# Patient Record
Sex: FEMALE | Race: WHITE | NOT HISPANIC OR LATINO | Employment: FULL TIME | URBAN - METROPOLITAN AREA
[De-identification: names, ages, dates, MRNs, and addresses within clinical notes are randomized per-mention and may not be internally consistent; named-entity substitution may affect disease eponyms.]

---

## 2021-05-10 ENCOUNTER — TELEPHONE (OUTPATIENT)
Dept: ALLERGY | Facility: CLINIC | Age: 61
End: 2021-05-10

## 2021-05-11 ENCOUNTER — TELEPHONE (OUTPATIENT)
Dept: ALLERGY | Facility: CLINIC | Age: 61
End: 2021-05-11

## 2021-06-10 ENCOUNTER — OFFICE VISIT (OUTPATIENT)
Dept: ALLERGY | Facility: CLINIC | Age: 61
End: 2021-06-10
Payer: COMMERCIAL

## 2021-06-10 VITALS
BODY MASS INDEX: 31.27 KG/M2 | HEART RATE: 79 BPM | TEMPERATURE: 98 F | WEIGHT: 183.19 LBS | SYSTOLIC BLOOD PRESSURE: 113 MMHG | DIASTOLIC BLOOD PRESSURE: 68 MMHG | HEIGHT: 64 IN

## 2021-06-10 DIAGNOSIS — J40 BRONCHITIS: ICD-10-CM

## 2021-06-10 DIAGNOSIS — J30.89 NON-SEASONAL ALLERGIC RHINITIS DUE TO OTHER ALLERGIC TRIGGER: ICD-10-CM

## 2021-06-10 DIAGNOSIS — R06.83 SNORING: ICD-10-CM

## 2021-06-10 DIAGNOSIS — R06.5 MOUTH BREATHING: ICD-10-CM

## 2021-06-10 DIAGNOSIS — J01.90 ACUTE NON-RECURRENT SINUSITIS, UNSPECIFIED LOCATION: Primary | ICD-10-CM

## 2021-06-10 DIAGNOSIS — E66.9 OBESITY, UNSPECIFIED CLASSIFICATION, UNSPECIFIED OBESITY TYPE, UNSPECIFIED WHETHER SERIOUS COMORBIDITY PRESENT: ICD-10-CM

## 2021-06-10 PROCEDURE — 99214 OFFICE O/P EST MOD 30 MIN: CPT | Mod: S$GLB,,, | Performed by: SPECIALIST

## 2021-06-10 PROCEDURE — 3008F BODY MASS INDEX DOCD: CPT | Mod: CPTII,S$GLB,, | Performed by: SPECIALIST

## 2021-06-10 PROCEDURE — 3008F PR BODY MASS INDEX (BMI) DOCUMENTED: ICD-10-PCS | Mod: CPTII,S$GLB,, | Performed by: SPECIALIST

## 2021-06-10 PROCEDURE — 99999 PR PBB SHADOW E&M-EST. PATIENT-LVL III: CPT | Mod: PBBFAC,,, | Performed by: SPECIALIST

## 2021-06-10 PROCEDURE — 99214 PR OFFICE/OUTPT VISIT, EST, LEVL IV, 30-39 MIN: ICD-10-PCS | Mod: S$GLB,,, | Performed by: SPECIALIST

## 2021-06-10 PROCEDURE — 99999 PR PBB SHADOW E&M-EST. PATIENT-LVL III: ICD-10-PCS | Mod: PBBFAC,,, | Performed by: SPECIALIST

## 2021-06-10 RX ORDER — LEVOCETIRIZINE DIHYDROCHLORIDE 5 MG/1
5 TABLET, FILM COATED ORAL NIGHTLY
COMMUNITY

## 2021-06-10 RX ORDER — OLMESARTAN MEDOXOMIL 40 MG/1
TABLET ORAL
COMMUNITY

## 2021-06-10 RX ORDER — PREDNISONE 20 MG/1
20 TABLET ORAL DAILY PRN
Qty: 15 TABLET | Refills: 0 | Status: SHIPPED | OUTPATIENT
Start: 2021-06-10 | End: 2021-06-15

## 2021-06-10 RX ORDER — PRAVASTATIN SODIUM 80 MG/1
TABLET ORAL
COMMUNITY
Start: 2021-05-07

## 2021-06-10 RX ORDER — PANTOPRAZOLE SODIUM 40 MG/1
TABLET, DELAYED RELEASE ORAL
COMMUNITY
Start: 2021-01-27

## 2021-06-10 RX ORDER — ASPIRIN 81 MG/1
81 TABLET ORAL
COMMUNITY

## 2021-06-10 RX ORDER — AMLODIPINE AND OLMESARTAN MEDOXOMIL 5; 20 MG/1; MG/1
TABLET ORAL
COMMUNITY
Start: 2020-07-16

## 2021-06-10 RX ORDER — AZITHROMYCIN 250 MG/1
TABLET, FILM COATED ORAL
Qty: 11 TABLET | Refills: 0 | Status: SHIPPED | OUTPATIENT
Start: 2021-06-10 | End: 2021-06-20

## 2021-06-10 RX ORDER — ALPRAZOLAM 0.25 MG/1
0.25 TABLET ORAL DAILY PRN
COMMUNITY
Start: 2020-07-31 | End: 2023-06-15

## 2021-06-10 RX ORDER — DULOXETIN HYDROCHLORIDE 60 MG/1
60 CAPSULE, DELAYED RELEASE ORAL DAILY
COMMUNITY

## 2021-06-10 RX ORDER — FLUTICASONE PROPIONATE 50 MCG
1 SPRAY, SUSPENSION (ML) NASAL
COMMUNITY
Start: 2021-02-03 | End: 2021-08-02

## 2021-06-10 RX ORDER — INDAPAMIDE 1.25 MG/1
TABLET ORAL
COMMUNITY

## 2021-06-10 RX ORDER — PROMETHAZINE HYDROCHLORIDE AND DEXTROMETHORPHAN HYDROBROMIDE 6.25; 15 MG/5ML; MG/5ML
SYRUP ORAL
COMMUNITY

## 2021-09-13 PROBLEM — J01.90 ACUTE NON-RECURRENT SINUSITIS: Status: RESOLVED | Noted: 2021-06-10 | Resolved: 2021-09-13

## 2022-06-14 NOTE — PROGRESS NOTES
Subjective:       Patient ID: Wilber Lancaster is a 62 y.o. female.    Chief Complaint:    Follow up on allergic rhinitis and  suinusitis    HPI:    [age] year old WF for follow up.Has a history of having recurrent sinusitis, bronchitis and URIs and LRIs.  ALL infections were promptly treated. Immune work up revealed the need to immunize with Pneumococcal vaccines.   She was immunized with two PPSV- 23-- LAST ONE WAS ON 12- 29- 2010.   IS VERY MUCH HELPED BY THE PPSV - 23 vaccines and is having less number of infections.  Has year round nasal and eye allergies- treated symptomatically-- not on SLIT or SCIT.    HAS A LONG STANDING HISTORY OF GERD-- TREATED WITH A PPI    In Jan 2021-- she had moderate severity COVID-- NOW IMMUNIZED WITH COVID VACCINE - 2 DOSES.  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 No ongoing allergens or irritants exposure       Doxylamin-pse-dm-acetaminophen, Pseudoephedrine hcl, and Triprolidine-pseudoephedrine --- are the drug allergies reported    Past Medical History:   Diagnosis Date    Allergy        No family history on file.    Environmental History: Dust Mite Controls: Dust mite controls are already in place.     Review of Systems   Constitutional: Negative.  Negative for fatigue and fever.   HENT: Positive for congestion, postnasal drip and rhinorrhea. Negative for ear pain, sinus pressure, sinus pain, sneezing and sore throat.    Eyes: Positive for itching. Negative for redness.   Respiratory: Negative.  Negative for cough, chest tightness, shortness of breath and wheezing.    Cardiovascular: Negative.  Negative for chest pain.   Gastrointestinal: Negative.  Negative for nausea.   Endocrine: Negative.  Negative for cold intolerance.   Genitourinary: Negative.  Negative for frequency.   Musculoskeletal: Negative.  Negative for myalgias.   Skin: Negative.  Negative for rash.   Allergic/Immunologic: Positive for environmental allergies. Negative for food allergies and immunocompromised  state.   Neurological: Negative.  Negative for dizziness and headaches.   Hematological: Negative.  Negative for adenopathy.   Psychiatric/Behavioral: Negative.  Negative for sleep disturbance.       Objective:     There were no vitals taken for this visit.    Physical Exam  Vitals and nursing note reviewed.   Constitutional:       Appearance: Normal appearance. She is obese.   HENT:      Head: Normocephalic and atraumatic.      Right Ear: Tympanic membrane, ear canal and external ear normal.      Left Ear: Tympanic membrane, ear canal and external ear normal.      Nose: Congestion present.      Mouth/Throat:      Mouth: Mucous membranes are dry.      Pharynx: Oropharynx is clear.   Eyes:      Extraocular Movements: Extraocular movements intact.      Conjunctiva/sclera: Conjunctivae normal.      Pupils: Pupils are equal, round, and reactive to light.   Cardiovascular:      Rate and Rhythm: Normal rate and regular rhythm.      Pulses: Normal pulses.      Heart sounds: Normal heart sounds.   Pulmonary:      Effort: Pulmonary effort is normal.      Breath sounds: Normal breath sounds.   Abdominal:      General: Abdomen is flat. Bowel sounds are normal.      Palpations: Abdomen is soft.   Musculoskeletal:         General: Normal range of motion.      Cervical back: Normal range of motion and neck supple.   Skin:     General: Skin is warm and dry.      Capillary Refill: Capillary refill takes less than 2 seconds.   Neurological:      General: No focal deficit present.      Mental Status: She is alert and oriented to person, place, and time. Mental status is at baseline.   Psychiatric:         Mood and Affect: Mood normal.         Behavior: Behavior normal.         Thought Content: Thought content normal.         Judgment: Judgment normal.           Assessment:      1. Perennial allergic rhinitis with seasonal variation    2. PND (post-nasal drip)    3. Mouth breathing    4. Nasal congestion    5       Breast cancer  survivor.  6       SEASONAL ALLERGIC Conjunctivitis    Plan:       FLONASE 50 Mcg qd or bid  ASTELIN 137 mcg-- 2 sprays per nares qd or bid  XYZAL 5mg qd  Systane or Thera Tears 2 drops bid prn  Treat all infections  For acute sinusitis-- a 10 days course of Azithromycin 250 mg plus Prednisone 20 mg qd for 3- 5 days  Had COVID in JAN 2021.  HAD covid vaccine -- 2 doses.  Had PPSV- 23-- TWO DOSES April 1999 and December 2010  Yearly follow ups                  Problems Address                                                 Amount and/or Complexity                                                                      Risk       3           [] 2 or more self-limited or minor problems                      [] Limited                                                                        [] Low                  [] 1 stable chronic illness                                                  Any combination of the two                                               OTC drugs                  []Acute, uncomplicated illness or injury                            Review of prior external notes from unique source           Minor surgery with no risk factors                                                                                                               [] 1 []2  []3+                                                                                                              Review of results from each unique test                                                                                                               [] 1 []2  [] 3+                                                                                                              Order of each unique test                                                                                                               [] 1 []2  [] 3+                                                                                                              Or                                                                                                              [] Assessment requiring an independent historian      4            [x] One or more chronic illness with exacerbation,              [x] Moderate                                                                      [x] Moderate                 Progression, or side effects of treatment                            -test documents or independent historians                        Prescription drug management                [x]  2 or more sable chronic illnesses                                    [] Independent interpretation of tests                              Minor surgery with identifiable risk                [] 1 undiagnosed new problem with uncertain prognosis    [] Discussion or management of test results                    elective major surgery                [] 1 acute illness with                systemic symptoms                                                                                                                                                              [] 1 acute complicated injury                                                                                                                                          Elective major surgery                                                                                                                                                                                                                                                                                                                                                                                                  5            [] 1 or more chronic illnesses with severe exacerbation,     [] Extensive(two from below)                                         [] High                                                                                                               [] Independent interpretation of  results                         Drug therapy requiring intensive                                                                                                               []Discussion of management or test interpretation           monitoring                                                                                                                                                                                                       Decision to de-escalate care                 [] 1 acute or chronic illness or injury that poses a threat                                                                                               Decision regarding hospitalization

## 2022-06-15 ENCOUNTER — OFFICE VISIT (OUTPATIENT)
Dept: ALLERGY | Facility: CLINIC | Age: 62
End: 2022-06-15
Payer: COMMERCIAL

## 2022-06-15 VITALS
TEMPERATURE: 98 F | HEIGHT: 63 IN | SYSTOLIC BLOOD PRESSURE: 109 MMHG | WEIGHT: 189.63 LBS | DIASTOLIC BLOOD PRESSURE: 68 MMHG | HEART RATE: 75 BPM | BODY MASS INDEX: 33.6 KG/M2

## 2022-06-15 DIAGNOSIS — J30.89 PERENNIAL ALLERGIC RHINITIS WITH SEASONAL VARIATION: Primary | ICD-10-CM

## 2022-06-15 DIAGNOSIS — R09.81 NASAL CONGESTION: ICD-10-CM

## 2022-06-15 DIAGNOSIS — J30.2 PERENNIAL ALLERGIC RHINITIS WITH SEASONAL VARIATION: Primary | ICD-10-CM

## 2022-06-15 DIAGNOSIS — R09.82 PND (POST-NASAL DRIP): ICD-10-CM

## 2022-06-15 DIAGNOSIS — R06.5 MOUTH BREATHING: ICD-10-CM

## 2022-06-15 PROCEDURE — 3078F PR MOST RECENT DIASTOLIC BLOOD PRESSURE < 80 MM HG: ICD-10-PCS | Mod: CPTII,S$GLB,, | Performed by: SPECIALIST

## 2022-06-15 PROCEDURE — 1160F PR REVIEW ALL MEDS BY PRESCRIBER/CLIN PHARMACIST DOCUMENTED: ICD-10-PCS | Mod: CPTII,S$GLB,, | Performed by: SPECIALIST

## 2022-06-15 PROCEDURE — 99999 PR PBB SHADOW E&M-EST. PATIENT-LVL IV: ICD-10-PCS | Mod: PBBFAC,,, | Performed by: SPECIALIST

## 2022-06-15 PROCEDURE — 1160F RVW MEDS BY RX/DR IN RCRD: CPT | Mod: CPTII,S$GLB,, | Performed by: SPECIALIST

## 2022-06-15 PROCEDURE — 1159F MED LIST DOCD IN RCRD: CPT | Mod: CPTII,S$GLB,, | Performed by: SPECIALIST

## 2022-06-15 PROCEDURE — 1159F PR MEDICATION LIST DOCUMENTED IN MEDICAL RECORD: ICD-10-PCS | Mod: CPTII,S$GLB,, | Performed by: SPECIALIST

## 2022-06-15 PROCEDURE — 99999 PR PBB SHADOW E&M-EST. PATIENT-LVL IV: CPT | Mod: PBBFAC,,, | Performed by: SPECIALIST

## 2022-06-15 PROCEDURE — 4010F PR ACE/ARB THEARPY RXD/TAKEN: ICD-10-PCS | Mod: CPTII,S$GLB,, | Performed by: SPECIALIST

## 2022-06-15 PROCEDURE — 3008F BODY MASS INDEX DOCD: CPT | Mod: CPTII,S$GLB,, | Performed by: SPECIALIST

## 2022-06-15 PROCEDURE — 3078F DIAST BP <80 MM HG: CPT | Mod: CPTII,S$GLB,, | Performed by: SPECIALIST

## 2022-06-15 PROCEDURE — 3074F PR MOST RECENT SYSTOLIC BLOOD PRESSURE < 130 MM HG: ICD-10-PCS | Mod: CPTII,S$GLB,, | Performed by: SPECIALIST

## 2022-06-15 PROCEDURE — 4010F ACE/ARB THERAPY RXD/TAKEN: CPT | Mod: CPTII,S$GLB,, | Performed by: SPECIALIST

## 2022-06-15 PROCEDURE — 3008F PR BODY MASS INDEX (BMI) DOCUMENTED: ICD-10-PCS | Mod: CPTII,S$GLB,, | Performed by: SPECIALIST

## 2022-06-15 PROCEDURE — 3074F SYST BP LT 130 MM HG: CPT | Mod: CPTII,S$GLB,, | Performed by: SPECIALIST

## 2022-06-15 PROCEDURE — 99214 OFFICE O/P EST MOD 30 MIN: CPT | Mod: S$GLB,,, | Performed by: SPECIALIST

## 2022-06-15 PROCEDURE — 99214 PR OFFICE/OUTPT VISIT, EST, LEVL IV, 30-39 MIN: ICD-10-PCS | Mod: S$GLB,,, | Performed by: SPECIALIST

## 2022-06-15 RX ORDER — METFORMIN HYDROCHLORIDE 500 MG/1
TABLET, EXTENDED RELEASE ORAL
COMMUNITY
Start: 2022-05-15

## 2022-06-15 RX ORDER — AZELASTINE 1 MG/ML
2 SPRAY, METERED NASAL 2 TIMES DAILY
Qty: 30 ML | Refills: 11 | Status: SHIPPED | OUTPATIENT
Start: 2022-06-15 | End: 2023-06-15

## 2023-06-13 NOTE — PROGRESS NOTES
Subjective:       Patient ID: Wilber Lancaster is a 63 y.o. female.    Chief Complaint:    FOLLOW UP ON ALLERGIC RHINITIS AND RECURRENT INFECTIONS    HPI:   female 63 year old had had decades history of recurrent sinusitis and sino- bronchitis-- She did well  after immune  Work up and immunization with at least Pneumovax- 23-- Last one in December 2020.  Since the she has been getting much less infections    Has decades of history of perennial allergic rhinitis-- No recent allergy work up.  She was not started on AIT- SCIT OR SLIT.  Never smoked cigarettes or vaped. nNo ongoing allergens exposure.    Had had GERD for years- treated with a PPI as needed and anti reflux measures.  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    Past Medical History:   Diagnosis Date    Allergy        No family history on file.    Environmental History: Dust Mite Controls: Dust mite controls are already in place.     Review of Systems   Constitutional: Negative.  Negative for fatigue and fever.   HENT:  Positive for congestion, postnasal drip and rhinorrhea. Negative for ear pain, sinus pressure, sinus pain, sneezing and sore throat.    Eyes:  Positive for itching. Negative for redness.   Respiratory: Negative.  Negative for cough, chest tightness, shortness of breath and wheezing.    Cardiovascular: Negative.  Negative for chest pain.   Gastrointestinal: Negative.  Negative for nausea.   Endocrine: Negative.  Negative for cold intolerance.   Genitourinary: Negative.  Negative for frequency.   Musculoskeletal: Negative.  Negative for myalgias.   Skin: Negative.  Negative for rash.   Allergic/Immunologic: Positive for environmental allergies. Negative for food allergies and immunocompromised state.   Neurological:  Negative.  Negative for dizziness and headaches.   Hematological: Negative.  Negative for adenopathy.   Psychiatric/Behavioral: Negative.  Negative for sleep disturbance.      Objective:     There were no vitals taken for this visit.    Physical Exam  Vitals and nursing note reviewed.   Constitutional:       Appearance: Normal appearance. She is normal weight.   HENT:      Head: Normocephalic and atraumatic.      Right Ear: Tympanic membrane, ear canal and external ear normal.      Left Ear: Tympanic membrane, ear canal and external ear normal.      Nose: Congestion present.      Mouth/Throat:      Mouth: Mucous membranes are moist.      Pharynx: Oropharynx is clear.   Eyes:      Extraocular Movements: Extraocular movements intact.      Conjunctiva/sclera: Conjunctivae normal.      Pupils: Pupils are equal, round, and reactive to light.   Cardiovascular:      Rate and Rhythm: Normal rate and regular rhythm.      Pulses: Normal pulses.   Pulmonary:      Effort: Pulmonary effort is normal.      Breath sounds: Normal breath sounds.   Abdominal:      General: Abdomen is flat. Bowel sounds are normal.      Palpations: Abdomen is soft.   Musculoskeletal:         General: Normal range of motion.      Cervical back: Normal range of motion and neck supple.   Skin:     General: Skin is warm and dry.      Capillary Refill: Capillary refill takes less than 2 seconds.   Neurological:      General: No focal deficit present.      Mental Status: She is alert and oriented to person, place, and time. Mental status is at baseline.   Psychiatric:         Mood and Affect: Mood normal.         Behavior: Behavior normal.         Thought Content: Thought content normal.         Judgment: Judgment normal.       Assessment:      Perennial ALLERGIC Rhinitis- with seasonal exacerbation.  PND  Nasal congestion.  Seasonal allergic Conjunctivitis.  Breast cancer survivor  Recurrent sinusitis      Plan:     Treat all infections-- a course of  Azithromycin 250 mg plus prednisone 20 mg qd for 3- 5 days  XYZAL 5 mg.  Azelastine 137 mcg  and Flonase 50 mcg - qd or bid  Thera Tears or Systane 2 drops bid.  Had 4 DOSES OF COVID vaccine.-- had COVID at [facility]23-- AFTER 4 MONTH OLD CHILD BROUGHT IT FROM THE   Had two doses of Pneumovax- 23( April 1999 and December 2010 ).  May immunize with PCV- 20.  ANNUAL INFLUENZA VACCINATIONS  Follow up in one year.                  Problems Address                                                 Amount and/or Complexity                                                                      Risk       3           [] 2 or more self-limited or minor problems                      [] Limited                                                                        [] Low                  [] 1 stable chronic illness                                                  Any combination of the two                                               OTC drugs                  []Acute, uncomplicated illness or injury                            Review of prior external notes from unique source           Minor surgery with no risk factors                                                                                                               [] 1 []2  []3+                                                                                                              Review of results from each unique test                                                                                                               [] 1 []2  [] 3+                                                                                                              Order of each unique test                                                                                                               [] 1 []2  [] 3+                                                                                                              Or                                                                                                              [] Assessment requiring an independent historian      4            [x] One or more chronic illness with exacerbation,              [x] Moderate                                                                      [x] Moderate                 Progression, or side effects of treatment                            -test documents or independent historians                        Prescription drug management                [x]  2 or more sable chronic illnesses                                    [] Independent interpretation of tests                              Minor surgery with identifiable risk                [] 1 undiagnosed new problem with uncertain prognosis    [] Discussion or management of test results                    elective major surgery                [] 1 acute illness with                systemic symptoms                                                                                                                                                              [] 1 acute complicated injury                                                                                                                                          Elective major surgery                                                                                                                                                                                                                                                                                                                                                                                                  5            [] 1 or more chronic illnesses with severe exacerbation,     [] Extensive(two from below)                                         [] High                                                                                                               [] Independent interpretation of results                          Drug therapy requiring intensive                                                                                                               []Discussion of management or test interpretation           monitoring                                                                                                                                                                                                       Decision to de-escalate care                 [] 1 acute or chronic illness or injury that poses a threat                                                                                               Decision regarding hospitalization

## 2023-06-15 ENCOUNTER — OFFICE VISIT (OUTPATIENT)
Dept: ALLERGY | Facility: CLINIC | Age: 63
End: 2023-06-15
Payer: COMMERCIAL

## 2023-06-15 VITALS
DIASTOLIC BLOOD PRESSURE: 71 MMHG | BODY MASS INDEX: 31.99 KG/M2 | HEART RATE: 89 BPM | TEMPERATURE: 98 F | WEIGHT: 180.56 LBS | RESPIRATION RATE: 19 BRPM | SYSTOLIC BLOOD PRESSURE: 126 MMHG | HEIGHT: 63 IN

## 2023-06-15 DIAGNOSIS — R09.82 PND (POST-NASAL DRIP): ICD-10-CM

## 2023-06-15 DIAGNOSIS — R06.5 MOUTH BREATHING: ICD-10-CM

## 2023-06-15 DIAGNOSIS — H10.10 SEASONAL ALLERGIC CONJUNCTIVITIS: ICD-10-CM

## 2023-06-15 DIAGNOSIS — J30.89 PERENNIAL ALLERGIC RHINITIS WITH SEASONAL VARIATION: Primary | ICD-10-CM

## 2023-06-15 DIAGNOSIS — J30.2 PERENNIAL ALLERGIC RHINITIS WITH SEASONAL VARIATION: Primary | ICD-10-CM

## 2023-06-15 PROCEDURE — 1159F MED LIST DOCD IN RCRD: CPT | Mod: CPTII,S$GLB,, | Performed by: SPECIALIST

## 2023-06-15 PROCEDURE — 1160F RVW MEDS BY RX/DR IN RCRD: CPT | Mod: CPTII,S$GLB,, | Performed by: SPECIALIST

## 2023-06-15 PROCEDURE — 99214 PR OFFICE/OUTPT VISIT, EST, LEVL IV, 30-39 MIN: ICD-10-PCS | Mod: S$GLB,,, | Performed by: SPECIALIST

## 2023-06-15 PROCEDURE — 99999 PR PBB SHADOW E&M-EST. PATIENT-LVL IV: CPT | Mod: PBBFAC,,, | Performed by: SPECIALIST

## 2023-06-15 PROCEDURE — 3008F BODY MASS INDEX DOCD: CPT | Mod: CPTII,S$GLB,, | Performed by: SPECIALIST

## 2023-06-15 PROCEDURE — 3074F PR MOST RECENT SYSTOLIC BLOOD PRESSURE < 130 MM HG: ICD-10-PCS | Mod: CPTII,S$GLB,, | Performed by: SPECIALIST

## 2023-06-15 PROCEDURE — 1160F PR REVIEW ALL MEDS BY PRESCRIBER/CLIN PHARMACIST DOCUMENTED: ICD-10-PCS | Mod: CPTII,S$GLB,, | Performed by: SPECIALIST

## 2023-06-15 PROCEDURE — 3078F PR MOST RECENT DIASTOLIC BLOOD PRESSURE < 80 MM HG: ICD-10-PCS | Mod: CPTII,S$GLB,, | Performed by: SPECIALIST

## 2023-06-15 PROCEDURE — 3074F SYST BP LT 130 MM HG: CPT | Mod: CPTII,S$GLB,, | Performed by: SPECIALIST

## 2023-06-15 PROCEDURE — 3078F DIAST BP <80 MM HG: CPT | Mod: CPTII,S$GLB,, | Performed by: SPECIALIST

## 2023-06-15 PROCEDURE — 3008F PR BODY MASS INDEX (BMI) DOCUMENTED: ICD-10-PCS | Mod: CPTII,S$GLB,, | Performed by: SPECIALIST

## 2023-06-15 PROCEDURE — 4010F PR ACE/ARB THEARPY RXD/TAKEN: ICD-10-PCS | Mod: CPTII,S$GLB,, | Performed by: SPECIALIST

## 2023-06-15 PROCEDURE — 99999 PR PBB SHADOW E&M-EST. PATIENT-LVL IV: ICD-10-PCS | Mod: PBBFAC,,, | Performed by: SPECIALIST

## 2023-06-15 PROCEDURE — 4010F ACE/ARB THERAPY RXD/TAKEN: CPT | Mod: CPTII,S$GLB,, | Performed by: SPECIALIST

## 2023-06-15 PROCEDURE — 1159F PR MEDICATION LIST DOCUMENTED IN MEDICAL RECORD: ICD-10-PCS | Mod: CPTII,S$GLB,, | Performed by: SPECIALIST

## 2023-06-15 PROCEDURE — 99214 OFFICE O/P EST MOD 30 MIN: CPT | Mod: S$GLB,,, | Performed by: SPECIALIST

## 2024-06-08 NOTE — PROGRESS NOTES
Subjective:       Patient ID: Wilber Lancaster is a 64 y.o. female.    Chief Complaint:    FOLLOW UP ON PERENNIAL ALLERGIC RHINITIS AND RECURRENT SINUSITIS- doing OK    HPI:    Follow up--  64 year old for annual follow up..  Brittani has been a patient for over 2 decades.   AFTER IMMUNE WORK UP, SHE WAS IMMUNIZED WITH Pneumovax- 23 AND Prevnar- 13.  Those have helped her very much..    Brittani had had perennial allergic rhinitis- She was never on AIT- SCIT OR SLIT.  . BRITTANI HAS DONE WELL ON SYMPTOMATIC TREATMENT.  She has a long standing history of GERD- treated with anti reflux measures and a PPI- as needed.  Never smoked cigarettes or vaped. No ongoing allergens or irritants exposure.  Gets follow up oncology visits- as a breast cancer survivor. Not yet retired .- works 5- days a week.  Has 2 grand children. Will keep yearly follow ups.         Doxylamin-pse-dm-acetaminophen, Pseudoephedrine hcl, and Triprolidine-pseudoephedrine -- allergies were reported    Past Medical History:   Diagnosis Date    Allergy        No family history on file.    Environmental History: Dust Mite Controls: Dust mite controls are already in place.     Review of Systems   Constitutional: Negative.    HENT:  Positive for congestion, postnasal drip and rhinorrhea.    Eyes: Negative.    Respiratory: Negative.     Cardiovascular: Negative.    Gastrointestinal: Negative.    Endocrine: Negative.    Genitourinary: Negative.    Musculoskeletal: Negative.    Skin: Negative.    Allergic/Immunologic: Positive for environmental allergies.   Neurological: Negative.    Hematological: Negative.      Objective:     There were no vitals taken for this visit.    Physical Exam  Vitals and nursing note reviewed.   Constitutional:       Appearance: Normal appearance. She is obese.   HENT:      Head: Normocephalic and atraumatic.      Right Ear: Tympanic membrane, ear canal and external ear normal.      Left Ear: Tympanic membrane, ear  canal and external ear normal.      Nose: Congestion and rhinorrhea present.      Mouth/Throat:      Mouth: Mucous membranes are moist.      Pharynx: Oropharynx is clear.   Eyes:      Extraocular Movements: Extraocular movements intact.      Conjunctiva/sclera: Conjunctivae normal.      Pupils: Pupils are equal, round, and reactive to light.   Cardiovascular:      Rate and Rhythm: Normal rate and regular rhythm.      Pulses: Normal pulses.      Heart sounds: Normal heart sounds.   Pulmonary:      Effort: Pulmonary effort is normal.      Breath sounds: Normal breath sounds.   Abdominal:      General: Abdomen is flat. Bowel sounds are normal.      Palpations: Abdomen is soft.   Musculoskeletal:         General: Normal range of motion.      Cervical back: Normal range of motion and neck supple.   Skin:     General: Skin is warm and dry.      Capillary Refill: Capillary refill takes less than 2 seconds.   Neurological:      General: No focal deficit present.      Mental Status: She is alert and oriented to person, place, and time. Mental status is at baseline.   Psychiatric:         Mood and Affect: Mood normal.         Behavior: Behavior normal.         Thought Content: Thought content normal.         Judgment: Judgment normal.       Assessment:      1. Recurrent sinusitis    2. Perennial allergic rhinitis with seasonal variation    3. PND (post-nasal drip)    4. Nasal congestion    5. Seasonal allergic conjunctivitis    6       Breast cancer survivor    Plan:     Re- start on -Azelastine 137 mcg and-- not taking- Flonase 50 mcg qd / bid  XYZAL 5 mg.- likes it at nights  Thera Tears / Systane gel hs.  Had immunization with 2 Pneumovax- 23------ last one in December 2010  May immunize with Prevnar- 20.  --------------------------------------------------------  Had AREXVY.- 11- 17- 2023  HAD 2 DOSES OF SHINGRIX.  Had 3 doses of COVID vaccine.  May immunize with influenza vaccinations.  Had COVID- Easter of 2023.  Yearly  follow ups.                  Problems Address                                                 Amount and/or Complexity                                                                      Risk       3           [] 2 or more self-limited or minor problems                      [] Limited                                                                        [] Low                  [] 1 stable chronic illness                                                  Any combination of the two                                               OTC drugs                  []Acute, uncomplicated illness or injury                            Review of prior external notes from unique source           Minor surgery with no risk factors                                                                                                               [] 1 []2  []3+                                                                                                              Review of results from each unique test                                                                                                               [] 1 []2  [] 3+                                                                                                              Order of each unique test                                                                                                               [] 1 []2  [] 3+                                                                                                              Or                                                                                                             [] Assessment requiring an independent historian      4            [x] One or more chronic illness with exacerbation,              [x] Moderate                                                                      [x] Moderate                 Progression, or side effects of treatment                            -test documents or independent  historians                        Prescription drug management                [x]  2 or more sable chronic illnesses                                    [] Independent interpretation of tests                              Minor surgery with identifiable risk                [] 1 undiagnosed new problem with uncertain prognosis    [] Discussion or management of test results                    elective major surgery                [] 1 acute illness with                systemic symptoms                                                                                                                                                              [] 1 acute complicated injury                                                                                                                                          Elective major surgery                                                                                                                                                                                                                                                                                                                                                                                                  5            [] 1 or more chronic illnesses with severe exacerbation,     [] Extensive(two from below)                                         [] High                                                                                                               [] Independent interpretation of results                         Drug therapy requiring intensive                                                                                                               []Discussion of management or test interpretation           monitoring                                                                                                                                                                                                        Decision to de-escalate care                 [] 1 acute or chronic illness or injury that poses a threat                                                                                               Decision regarding hospitalization

## 2024-06-10 ENCOUNTER — OFFICE VISIT (OUTPATIENT)
Dept: ALLERGY | Facility: CLINIC | Age: 64
End: 2024-06-10
Payer: COMMERCIAL

## 2024-06-10 VITALS
DIASTOLIC BLOOD PRESSURE: 72 MMHG | BODY MASS INDEX: 31.91 KG/M2 | WEIGHT: 180.13 LBS | TEMPERATURE: 98 F | SYSTOLIC BLOOD PRESSURE: 114 MMHG | HEART RATE: 75 BPM

## 2024-06-10 DIAGNOSIS — J30.89 PERENNIAL ALLERGIC RHINITIS WITH SEASONAL VARIATION: ICD-10-CM

## 2024-06-10 DIAGNOSIS — R09.81 NASAL CONGESTION: ICD-10-CM

## 2024-06-10 DIAGNOSIS — H10.10 SEASONAL ALLERGIC CONJUNCTIVITIS: ICD-10-CM

## 2024-06-10 DIAGNOSIS — R09.82 PND (POST-NASAL DRIP): ICD-10-CM

## 2024-06-10 DIAGNOSIS — J30.2 PERENNIAL ALLERGIC RHINITIS WITH SEASONAL VARIATION: ICD-10-CM

## 2024-06-10 DIAGNOSIS — J32.9 RECURRENT SINUSITIS: Primary | ICD-10-CM

## 2024-06-10 PROCEDURE — 4010F ACE/ARB THERAPY RXD/TAKEN: CPT | Mod: CPTII,S$GLB,, | Performed by: SPECIALIST

## 2024-06-10 PROCEDURE — 3008F BODY MASS INDEX DOCD: CPT | Mod: CPTII,S$GLB,, | Performed by: SPECIALIST

## 2024-06-10 PROCEDURE — 99999 PR PBB SHADOW E&M-EST. PATIENT-LVL IV: CPT | Mod: PBBFAC,,, | Performed by: SPECIALIST

## 2024-06-10 PROCEDURE — 3078F DIAST BP <80 MM HG: CPT | Mod: CPTII,S$GLB,, | Performed by: SPECIALIST

## 2024-06-10 PROCEDURE — 3074F SYST BP LT 130 MM HG: CPT | Mod: CPTII,S$GLB,, | Performed by: SPECIALIST

## 2024-06-10 PROCEDURE — 99214 OFFICE O/P EST MOD 30 MIN: CPT | Mod: S$GLB,,, | Performed by: SPECIALIST

## 2024-06-10 PROCEDURE — 1160F RVW MEDS BY RX/DR IN RCRD: CPT | Mod: CPTII,S$GLB,, | Performed by: SPECIALIST

## 2024-06-10 PROCEDURE — 1159F MED LIST DOCD IN RCRD: CPT | Mod: CPTII,S$GLB,, | Performed by: SPECIALIST

## 2024-06-10 RX ORDER — AZELASTINE 1 MG/ML
2 SPRAY, METERED NASAL 2 TIMES DAILY
Qty: 30 ML | Refills: 11 | Status: SHIPPED | OUTPATIENT
Start: 2024-06-10 | End: 2024-07-10

## 2025-06-24 ENCOUNTER — TELEPHONE (OUTPATIENT)
Dept: ALLERGY | Facility: CLINIC | Age: 65
End: 2025-06-24
Payer: COMMERCIAL

## 2025-06-25 ENCOUNTER — OFFICE VISIT (OUTPATIENT)
Dept: ALLERGY | Facility: CLINIC | Age: 65
End: 2025-06-25
Payer: COMMERCIAL

## 2025-06-25 VITALS
TEMPERATURE: 98 F | WEIGHT: 181.69 LBS | BODY MASS INDEX: 32.18 KG/M2 | DIASTOLIC BLOOD PRESSURE: 70 MMHG | HEART RATE: 72 BPM | SYSTOLIC BLOOD PRESSURE: 111 MMHG

## 2025-06-25 DIAGNOSIS — K21.9 GASTROESOPHAGEAL REFLUX DISEASE WITHOUT ESOPHAGITIS: ICD-10-CM

## 2025-06-25 DIAGNOSIS — R09.82 PND (POST-NASAL DRIP): ICD-10-CM

## 2025-06-25 DIAGNOSIS — J30.2 PERENNIAL ALLERGIC RHINITIS WITH SEASONAL VARIATION: Primary | ICD-10-CM

## 2025-06-25 DIAGNOSIS — H10.10 SEASONAL ALLERGIC CONJUNCTIVITIS: ICD-10-CM

## 2025-06-25 DIAGNOSIS — J30.89 PERENNIAL ALLERGIC RHINITIS WITH SEASONAL VARIATION: Primary | ICD-10-CM

## 2025-06-25 DIAGNOSIS — J32.9 RECURRENT SINUSITIS: ICD-10-CM

## 2025-06-25 DIAGNOSIS — H93.13 BILATERAL TINNITUS: ICD-10-CM

## 2025-06-25 DIAGNOSIS — R09.81 NASAL CONGESTION: ICD-10-CM

## 2025-06-25 DIAGNOSIS — H04.123 DRY EYES: ICD-10-CM

## 2025-06-25 PROCEDURE — 4010F ACE/ARB THERAPY RXD/TAKEN: CPT | Mod: CPTII,S$GLB,, | Performed by: SPECIALIST

## 2025-06-25 PROCEDURE — 1160F RVW MEDS BY RX/DR IN RCRD: CPT | Mod: CPTII,S$GLB,, | Performed by: SPECIALIST

## 2025-06-25 PROCEDURE — 99214 OFFICE O/P EST MOD 30 MIN: CPT | Mod: S$GLB,,, | Performed by: SPECIALIST

## 2025-06-25 PROCEDURE — 3008F BODY MASS INDEX DOCD: CPT | Mod: CPTII,S$GLB,, | Performed by: SPECIALIST

## 2025-06-25 PROCEDURE — 99999 PR PBB SHADOW E&M-EST. PATIENT-LVL IV: CPT | Mod: PBBFAC,,, | Performed by: SPECIALIST

## 2025-06-25 PROCEDURE — 1159F MED LIST DOCD IN RCRD: CPT | Mod: CPTII,S$GLB,, | Performed by: SPECIALIST

## 2025-06-25 PROCEDURE — 3074F SYST BP LT 130 MM HG: CPT | Mod: CPTII,S$GLB,, | Performed by: SPECIALIST

## 2025-06-25 PROCEDURE — 3078F DIAST BP <80 MM HG: CPT | Mod: CPTII,S$GLB,, | Performed by: SPECIALIST

## 2025-06-25 RX ORDER — AZELASTINE 1 MG/ML
2 SPRAY, METERED NASAL 2 TIMES DAILY
Qty: 30 ML | Refills: 12 | Status: SHIPPED | OUTPATIENT
Start: 2025-06-25 | End: 2025-07-25

## 2025-06-25 NOTE — PROGRESS NOTES
Subjective:       Patient ID: Wilber Lancaster is a 65 y.o. female.    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  Overall doing well. Had one sinus infection in May 2025- requiring an antibiotic and she also received a steroid injection at the PCPs office.    She loves Azelastine for PND and nasal congestion. Not taking Flonase. She is on XYZAL 5 mg in the mornings.  She is well versed with environmental control measures. Brittani was never started on AIT- SCIT OR SLIT.  She has a long history of GERD treated with anti GE reflux measures and a PPI , as needed.  She  never vaped nicotine or smoked cigarettes. No ongoing allergens or irritants exposure.  She shal regularly follow up at the Oncology clinic for breast cancet post treatment ( over a decade ago ) follow up.  She retired as a  [employer] -- in May 2025.  Has a new grand child, 2 months old. Has 4 grand kids in all- Two of her son's children- have cochlear implants.  Will follow up yearly.        Doxylamin-pse-dm-acetaminophen, Pseudoephedrine hcl, and Triprolidine-pseudoephedrine -- allergies were reported    Past Medical History:   Diagnosis Date    Allergy        No family history on file.    Environmental History: Dust Mite Controls: Dust mite controls are already in place.     Review of Systems   Constitutional: Negative.    HENT:  Positive for congestion and rhinorrhea.    Eyes:  Positive for itching.   Respiratory: Negative.     Cardiovascular: Negative.    Gastrointestinal: Negative.    Endocrine: Negative.    Genitourinary: Negative.    Musculoskeletal: Negative.    Skin: Negative.    Allergic/Immunologic: Negative.     Neurological: Negative.    Hematological: Negative.    Psychiatric/Behavioral: Negative.       Objective:     There were no vitals taken for this visit.    Physical Exam  Vitals and nursing note reviewed.   Constitutional:       Appearance: Normal appearance. She is obese.   HENT:      Head: Normocephalic and atraumatic.      Right Ear: Tympanic membrane, ear canal and external ear normal.      Left Ear: Tympanic membrane, ear canal and external ear normal.      Nose: Congestion and rhinorrhea present.      Mouth/Throat:      Mouth: Mucous membranes are moist.      Pharynx: Oropharynx is clear.   Eyes:      Extraocular Movements: Extraocular movements intact.      Conjunctiva/sclera: Conjunctivae normal.      Pupils: Pupils are equal, round, and reactive to light.   Cardiovascular:      Rate and Rhythm: Normal rate and regular rhythm.      Pulses: Normal pulses.      Heart sounds: Normal heart sounds.   Pulmonary:      Effort: Pulmonary effort is normal.      Breath sounds: Normal breath sounds.   Abdominal:      General: Abdomen is flat. Bowel sounds are normal.      Palpations: Abdomen is soft.   Musculoskeletal:         General: Normal range of motion.      Cervical back: Normal range of motion and neck supple.   Skin:     General: Skin is warm and dry.      Capillary Refill: Capillary refill takes less than 2 seconds.   Neurological:      General: No focal deficit present.      Mental Status: She is alert and oriented to person, place, and time. Mental status is at baseline.   Psychiatric:         Mood and Affect: Mood normal.         Behavior: Behavior normal.         Thought Content: Thought content normal.         Judgment: Judgment normal.       Assessment:      1. Recurrent sinusitis    2. Perennial allergic rhinitis with seasonal variation    3. Gastroesophageal reflux disease without esophagitis    4. PND (post-nasal drip)    5. Nasal congestion    6. Bilateral tinnitus    7. Seasonal allergic  conjunctivitis    8. Dry eyes        Plan:       Re emphasized environmental control measures.  Never been on AIT- SCIT OR SLIT.  ---------------------------------------------------------------------------------------   Azelastine 137 mcg-- QD / BID  . TECHNIQUE REVIEWED.  NO longer on Flonase   XYZAL 5 mg   Pataday one drop bid prn  Systane gel or eye drop bid prn  ----------------------------------------------------------  Had AREXVY VACCINE.  LAST PREVNAR- 20 WAS ON 11- 06- 2024  Had one Prevnar- 13 and Pneumovax- 23 in December 2010  ------------------------------------------------------------------------------------------  Had 2 Shingrix vaccine doses.  Annual influenza vaccinations.  ONCOLOGY FOLLOW UPS FOR BREAST CANCER.  FOLLOW UP IN ONE YEAR.                Problems Address                                                 Amount and/or Complexity                                                                      Risk       3           [] 2 or more self-limited or minor problems                      [] Limited                                                                        [] Low                  [] 1 stable chronic illness                                                  Any combination of the two                                               OTC drugs                  []Acute, uncomplicated illness or injury                            Review of prior external notes from unique source           Minor surgery with no risk factors                                                                                                               [] 1 []2  []3+                                                                                                              Review of results from each unique test                                                                                                               [] 1 []2  [] 3+                                                                                                               Order of each unique test                                                                                                               [] 1 []2  [] 3+                                                                                                              Or                                                                                                             [] Assessment requiring an independent historian      4            [] One or more chronic illness with exacerbation,              [] Moderate                                                                      [] Moderate                 Progression, or side effects of treatment                            -test documents or independent historians                        Prescription drug management                []  2 or more sable chronic illnesses                                    [] Independent interpretation of tests                              Minor surgery with identifiable risk                [] 1 undiagnosed new problem with uncertain prognosis    [] Discussion or management of test results                    elective major surgery                [] 1 acute illness with                systemic symptoms                                                                                                                                                              [] 1 acute complicated injury                                                                                                                                          Elective major surgery                                                                                                                                                                                                                                                                                                                                                                                                   5            [] 1 or more chronic illnesses with severe exacerbation,     [] Extensive(two from below)                                         [] High                                                                                                               [] Independent interpretation of results                         Drug therapy requiring intensive                                                                                                               []Discussion of management or test interpretation           monitoring                                                                                                                                                                                                       Decision to de-escalate care                 [] 1 acute or chronic illness or injury that poses a threat                                                                                               Decision regarding hospitalization